# Patient Record
Sex: MALE | Race: WHITE | HISPANIC OR LATINO | Employment: FULL TIME | URBAN - METROPOLITAN AREA
[De-identification: names, ages, dates, MRNs, and addresses within clinical notes are randomized per-mention and may not be internally consistent; named-entity substitution may affect disease eponyms.]

---

## 2022-10-31 ENCOUNTER — OFFICE VISIT (OUTPATIENT)
Dept: URGENT CARE | Facility: PHYSICIAN GROUP | Age: 22
End: 2022-10-31
Payer: COMMERCIAL

## 2022-10-31 VITALS
WEIGHT: 131 LBS | DIASTOLIC BLOOD PRESSURE: 68 MMHG | BODY MASS INDEX: 19.4 KG/M2 | RESPIRATION RATE: 12 BRPM | SYSTOLIC BLOOD PRESSURE: 110 MMHG | OXYGEN SATURATION: 96 % | HEIGHT: 69 IN | TEMPERATURE: 97.3 F | HEART RATE: 66 BPM

## 2022-10-31 DIAGNOSIS — K11.20 SUBMANDIBULAR GLAND INFLAMMATION: ICD-10-CM

## 2022-10-31 PROCEDURE — 99203 OFFICE O/P NEW LOW 30 MIN: CPT | Performed by: PHYSICIAN ASSISTANT

## 2022-10-31 RX ORDER — LIDOCAINE HYDROCHLORIDE 20 MG/ML
SOLUTION OROPHARYNGEAL
Qty: 120 ML | Refills: 0 | Status: SHIPPED | OUTPATIENT
Start: 2022-10-31

## 2022-10-31 RX ORDER — AMOXICILLIN AND CLAVULANATE POTASSIUM 875; 125 MG/1; MG/1
1 TABLET, FILM COATED ORAL 2 TIMES DAILY
Qty: 14 TABLET | Refills: 0 | Status: SHIPPED | OUTPATIENT
Start: 2022-10-31 | End: 2022-11-07

## 2022-10-31 NOTE — PROGRESS NOTES
"Subjective:   Julio C Jaramillo is a 22 y.o. male who presents for Swollen Glands (X 4 days underneath tongue )      HPI  The patient presents to the Urgent Care with complaints of swollen and tender salivary glands underneath his tongue.  Onset 3 days ago.  Denies any purulent drainage or discharge from the area.  No injury or trauma.  Redness and swelling to the ducts underneath his tongue.  Denies any trouble swallowing.  He denies any sore throat. Denies any fever, chills, sore throat, cough        Medications:    This patient does not have an active medication from one of the medication groupers.    Allergies: Patient has no allergy information on record.    Problem List: Julio C Jaramillo does not have a problem list on file.    Surgical History:  No past surgical history on file.    Past Social Hx: Julio C Jaramillo       Past Family Hx:  Julio C Jaramillo family history is not on file.     Problem list, medications, and allergies reviewed by myself today in Epic.     Objective:     /68 (BP Location: Left arm, Patient Position: Sitting, BP Cuff Size: Adult)   Pulse 66   Temp 36.3 °C (97.3 °F) (Temporal)   Resp 12   Ht 1.753 m (5' 9\")   Wt 59.4 kg (131 lb)   SpO2 96%   BMI 19.35 kg/m²     Physical Exam  Vitals reviewed.   Constitutional:       General: He is not in acute distress.     Appearance: Normal appearance. He is not ill-appearing or toxic-appearing.   HENT:      Mouth/Throat:      Pharynx: Oropharynx is clear. Uvula midline. No pharyngeal swelling, oropharyngeal exudate or posterior oropharyngeal erythema.      Tonsils: No tonsillar exudate or tonsillar abscesses.      Comments: Mild erythema and edema to ji's duct. No obvious visualized or palpable stone. No purulent drainage. Mildly tender. No mass.   Eyes:      Conjunctiva/sclera: Conjunctivae normal.      Pupils: Pupils are equal, round, and reactive to light.   Neck:      Comments: Mildly tender submandibular glands. " No erythema, edema, mass, or fluctuance.   Cardiovascular:      Rate and Rhythm: Normal rate.   Pulmonary:      Effort: Pulmonary effort is normal.   Musculoskeletal:      Cervical back: Neck supple. No rigidity.   Lymphadenopathy:      Cervical: No cervical adenopathy.      Right cervical: No superficial cervical adenopathy.     Left cervical: No superficial cervical adenopathy.   Skin:     General: Skin is warm and dry.   Neurological:      General: No focal deficit present.      Mental Status: He is alert and oriented to person, place, and time.   Psychiatric:         Mood and Affect: Mood normal.         Behavior: Behavior normal.       Diagnosis and associated orders:     1. Submandibular gland inflammation  - amoxicillin-clavulanate (AUGMENTIN) 875-125 MG Tab; Take 1 Tablet by mouth 2 times a day for 7 days.  Dispense: 14 Tablet; Refill: 0  - lidocaine (XYLOCAINE) 2 % Solution; Take 5 mL by mouth every 4-6 hours as needed for throat/mouth pain (rinse, gargle, and spit)  Dispense: 120 mL; Refill: 0     Comments/MDM:     Inflammation of [place]'s ducts.  Unclear if obstructed.  Will cover for possible developing infection with Augmentin.  Lidocaine solution for discomfort.  Ibuprofen for inflammation and pain.  Lemon drops.  Dental and oral hygiene.       I personally reviewed prior external notes and test results pertinent to today's visit. Pathogenesis of diagnosis discussed including typical length and natural progression. Supportive care, natural history, differential diagnoses, and indications for immediate follow-up discussed. Patient expresses understanding and agrees to plan. Patient denies any other questions or concerns.     Follow-up with the primary care physician for recheck, reevaluation, and consideration of further management.    Please note that this dictation was created using voice recognition software. I have made a reasonable attempt to correct obvious errors, but I expect that there are  errors of grammar and possibly content that I did not discover before finalizing the note.    This note was electronically signed by Gordy Milner PA-C

## 2022-12-21 ENCOUNTER — HOSPITAL ENCOUNTER (OUTPATIENT)
Dept: RADIOLOGY | Facility: MEDICAL CENTER | Age: 22
End: 2022-12-21
Attending: PHYSICIAN ASSISTANT
Payer: COMMERCIAL

## 2022-12-21 ENCOUNTER — OFFICE VISIT (OUTPATIENT)
Dept: URGENT CARE | Facility: CLINIC | Age: 22
End: 2022-12-21
Payer: COMMERCIAL

## 2022-12-21 ENCOUNTER — HOSPITAL ENCOUNTER (OUTPATIENT)
Facility: MEDICAL CENTER | Age: 22
End: 2022-12-21
Attending: PHYSICIAN ASSISTANT
Payer: COMMERCIAL

## 2022-12-21 VITALS
WEIGHT: 137 LBS | HEART RATE: 81 BPM | BODY MASS INDEX: 20.29 KG/M2 | OXYGEN SATURATION: 97 % | RESPIRATION RATE: 20 BRPM | HEIGHT: 69 IN | SYSTOLIC BLOOD PRESSURE: 120 MMHG | DIASTOLIC BLOOD PRESSURE: 78 MMHG | TEMPERATURE: 97.9 F

## 2022-12-21 DIAGNOSIS — N50.89 TESTICLE SWELLING: ICD-10-CM

## 2022-12-21 LAB
APPEARANCE UR: CLEAR
BILIRUB UR STRIP-MCNC: NEGATIVE MG/DL
COLOR UR AUTO: YELLOW
GLUCOSE UR STRIP.AUTO-MCNC: NEGATIVE MG/DL
KETONES UR STRIP.AUTO-MCNC: NEGATIVE MG/DL
LEUKOCYTE ESTERASE UR QL STRIP.AUTO: NEGATIVE
NITRITE UR QL STRIP.AUTO: POSITIVE
PH UR STRIP.AUTO: 7 [PH] (ref 5–8)
PROT UR QL STRIP: NEGATIVE MG/DL
RBC UR QL AUTO: NEGATIVE
SP GR UR STRIP.AUTO: 1.01
UROBILINOGEN UR STRIP-MCNC: 0.2 MG/DL

## 2022-12-21 PROCEDURE — 99214 OFFICE O/P EST MOD 30 MIN: CPT | Performed by: PHYSICIAN ASSISTANT

## 2022-12-21 PROCEDURE — 81002 URINALYSIS NONAUTO W/O SCOPE: CPT | Performed by: PHYSICIAN ASSISTANT

## 2022-12-21 PROCEDURE — 87491 CHLMYD TRACH DNA AMP PROBE: CPT

## 2022-12-21 PROCEDURE — 76870 US EXAM SCROTUM: CPT

## 2022-12-21 PROCEDURE — 87591 N.GONORRHOEAE DNA AMP PROB: CPT

## 2022-12-21 PROCEDURE — 87086 URINE CULTURE/COLONY COUNT: CPT

## 2022-12-21 ASSESSMENT — ENCOUNTER SYMPTOMS
FEVER: 0
AFFECTED TESTICLE: 1
CHILLS: 0
VOMITING: 0
FLANK PAIN: 0
ABDOMINAL PAIN: 1
NAUSEA: 0

## 2022-12-21 NOTE — PROGRESS NOTES
"Subjective:   Julio C Jaramillo  is a 22 y.o. male who presents for Testicle Pain (Swelling R testicle post sexual activity)      Testicle Pain  This is a new problem. The current episode started in the past 7 days. Associated symptoms include abdominal pain (Mild right low suprapubic). Pertinent negatives include no chills, fever, nausea, rash or vomiting.     Patient resents urgent care noting right testicle swelling that is mild and achy discomfort associated.  Patient states a similar process occurred last [place]  At that time ultrasound showed epididymitis.  He states he was initially treated with antibiotics and told to stop based on negative results of testing for gonorrhea chlamydia and other STIs.  Patient is in a long-term homosexual relationship.  His partner did recently visit him and they did have intercourse.  He denies any anal intercourse.  He noted increase in the above symptoms thereafter.  Patient states it may have been associated with mild trauma from the sexual encounter.  He describes achy swelling discomfort right testicle that worsens with time on his feet at work.  As he sits down he gets some relief.  Denies dysuria frequency urgency or hematuria.  Denies penile discharge.  Denies any genital rash.  He describes mild aching discomfort from right suprapubic area that radiates past testicle.    Review of Systems   Constitutional:  Negative for chills and fever.   Gastrointestinal:  Positive for abdominal pain (Mild right low suprapubic). Negative for nausea and vomiting.   Genitourinary:  Negative for dysuria, flank pain, frequency, hematuria and urgency.        Right testicle mild swelling and achy discomfort   Skin:  Negative for rash.     No Known Allergies     Objective:   /78   Pulse 81   Temp 36.6 °C (97.9 °F) (Temporal)   Resp 20   Ht 1.753 m (5' 9\")   Wt 62.1 kg (137 lb)   SpO2 97%   BMI 20.23 kg/m²     Physical Exam  Vitals and " nursing note reviewed.   Constitutional:       General: He is not in acute distress.     Appearance: Normal appearance. He is well-developed. He is not diaphoretic.   HENT:      Head: Normocephalic and atraumatic.      Right Ear: External ear normal.      Left Ear: External ear normal.      Nose: Nose normal.   Eyes:      General: No scleral icterus.        Right eye: No discharge.         Left eye: No discharge.      Conjunctiva/sclera: Conjunctivae normal.   Pulmonary:      Effort: Pulmonary effort is normal. No respiratory distress.   Abdominal:      Palpations: Abdomen is soft.      Tenderness: There is abdominal tenderness in the suprapubic area. There is no guarding or rebound. Negative signs include McBurney's sign.       Genitourinary:     Testes:         Right: Tenderness and swelling (possible trace, minimally clinically appreciative) present. Mass not present. Right testis is descended.      Epididymis:      Right: Tenderness present.   Musculoskeletal:         General: Normal range of motion.      Cervical back: Neck supple.   Skin:     General: Skin is warm and dry.      Coloration: Skin is not pale.   Neurological:      Mental Status: He is alert and oriented to person, place, and time.      Coordination: Coordination normal.     POCT UA -   Results for orders placed or performed in visit on 12/21/22   POCT Urinalysis   Result Value Ref Range    POC Color Yellow Negative    POC Appearance Clear Negative    POC Leukocyte Esterase Negative Negative    POC Nitrites Positive Negative    POC Urobiligen 0.2 Negative (0.2) mg/dL    POC Protein Negative Negative mg/dL    POC Urine PH 7.0 5.0 - 8.0    POC Blood Negative Negative    POC Specific Gravity 1.015 <1.005 - >1.030    POC Ketones Negative Negative mg/dL    POC Bilirubin Negative Negative mg/dL    POC Glucose Negative Negative mg/dL       GC pending  Urine cx pending    US SCROTAL CONTENTS -   IMPRESSION:     1.  Normal scrotum ultrasound.            Exam Ended: 12/21/22  4:56 PM Last Resulted: 12/21/22  5:04 PM           Assessment/Plan:   1. Testicle swelling  - POCT Urinalysis  - Chlamydia/GC, PCR (Urine); Future  - URINE CULTURE(NEW); Future  - [id number]; Future    Normal scrotal ultrasound  Nitrates seen on urinalysis but patient denies any urinary symptoms.  Will await urine culture and GC testing for further treatment    We will update patient with normal results via [facility] and call with need for additional treatment.  Return to clinic with lack of resolution or progression of symptoms.      I have worn an N95 mask, gloves and eye protection for the entire encounter with this patient.     Differential diagnosis, natural history, supportive care, and indications for immediate follow-up discussed.    My total time spent caring for the patient on the day of the encounter was 35 minutes.   This does not include time spent on separately billable procedures/tests.

## 2022-12-22 DIAGNOSIS — N50.89 TESTICLE SWELLING: ICD-10-CM

## 2022-12-22 LAB
C TRACH DNA SPEC QL NAA+PROBE: NEGATIVE
N GONORRHOEA DNA SPEC QL NAA+PROBE: NEGATIVE
SPECIMEN SOURCE: NORMAL

## 2022-12-24 LAB
BACTERIA UR CULT: NORMAL
SIGNIFICANT IND 70042: NORMAL
SITE SITE: NORMAL
SOURCE SOURCE: NORMAL

## 2023-04-05 ENCOUNTER — OFFICE VISIT (OUTPATIENT)
Dept: URGENT CARE | Facility: PHYSICIAN GROUP | Age: 23
End: 2023-04-05
Payer: COMMERCIAL

## 2023-04-05 VITALS
DIASTOLIC BLOOD PRESSURE: 70 MMHG | WEIGHT: 134 LBS | OXYGEN SATURATION: 97 % | TEMPERATURE: 97.8 F | RESPIRATION RATE: 14 BRPM | HEIGHT: 69 IN | HEART RATE: 64 BPM | BODY MASS INDEX: 19.85 KG/M2 | SYSTOLIC BLOOD PRESSURE: 106 MMHG

## 2023-04-05 DIAGNOSIS — U07.1 COVID-19: ICD-10-CM

## 2023-04-05 LAB
FLUAV RNA SPEC QL NAA+PROBE: NEGATIVE
FLUBV RNA SPEC QL NAA+PROBE: NEGATIVE
RSV RNA SPEC QL NAA+PROBE: NEGATIVE
SARS-COV-2 RNA RESP QL NAA+PROBE: POSITIVE

## 2023-04-05 PROCEDURE — 99213 OFFICE O/P EST LOW 20 MIN: CPT | Mod: CS | Performed by: NURSE PRACTITIONER

## 2023-04-05 PROCEDURE — 0241U POCT CEPHEID COV-2, FLU A/B, RSV - PCR: CPT | Performed by: NURSE PRACTITIONER

## 2023-04-05 ASSESSMENT — ENCOUNTER SYMPTOMS
EYES NEGATIVE: 1
HEADACHES: 1
RHINORRHEA: 1
COUGH: 1
MYALGIAS: 1
FEVER: 0
CARDIOVASCULAR NEGATIVE: 1
GASTROINTESTINAL NEGATIVE: 1
CHILLS: 0

## 2023-04-05 NOTE — PROGRESS NOTES
"Subjective:   Julio C Jaramillo is a 22 y.o. male who presents for Nasal Congestion (Some coughing, runny nose, congestion in head, body aches. X3 days. Wants COVID test. )      URI   This is a new problem. Episode onset: 3 days. The problem has been gradually worsening. There has been no fever. Associated symptoms include congestion, coughing, headaches and rhinorrhea. He has tried acetaminophen and NSAIDs for the symptoms. The treatment provided mild relief.     Review of Systems   Constitutional:  Positive for malaise/fatigue. Negative for chills and fever.   HENT:  Positive for congestion and rhinorrhea.    Eyes: Negative.    Respiratory:  Positive for cough.    Cardiovascular: Negative.    Gastrointestinal: Negative.    Musculoskeletal:  Positive for myalgias.   Skin: Negative.    Neurological:  Positive for headaches.     Medications, Allergies, and current problem list reviewed today in Epic.     Objective:     /70   Pulse 64   Temp 36.6 °C (97.8 °F)   Resp 14   Ht 1.753 m (5' 9\")   Wt 60.8 kg (134 lb)   SpO2 97%     Physical Exam  Vitals reviewed.   Constitutional:       General: He is not in acute distress.     Appearance: Normal appearance. He is normal weight.   HENT:      Head: Normocephalic and atraumatic.      Right Ear: Tympanic membrane, ear canal and external ear normal.      Left Ear: Tympanic membrane, ear canal and external ear normal.      Nose: Congestion and rhinorrhea present.      Mouth/Throat:      Mouth: Mucous membranes are moist.      Pharynx: Oropharynx is clear.   Eyes:      Extraocular Movements: Extraocular movements intact.      Conjunctiva/sclera: Conjunctivae normal.      Pupils: Pupils are equal, round, and reactive to light.   Cardiovascular:      Rate and Rhythm: Normal rate and regular rhythm.      Pulses: Normal pulses.      Heart sounds: Normal heart sounds.   Pulmonary:      Effort: Pulmonary effort is normal.      Breath sounds: Normal breath sounds. "   Abdominal:      General: Abdomen is flat. Bowel sounds are normal.      Palpations: Abdomen is soft.   Musculoskeletal:         General: Normal range of motion.      Cervical back: Normal range of motion and neck supple.   Skin:     General: Skin is warm and dry.      Capillary Refill: Capillary refill takes less than 2 seconds.   Neurological:      General: No focal deficit present.      Mental Status: He is alert and oriented to person, place, and time.   Psychiatric:         Mood and Affect: Mood normal.         Behavior: Behavior normal.       Assessment/Plan:     Diagnosis and associated orders:     1. COVID-19  POCT CoV-2, Flu A/B, RSV by PCR         Comments/MDM:     Patient found to be positive today in clinic with the rapid antigen test  Discussed with patient red flag signs, encouraged use of a pulse oximeter, especially if unvaccinated and consider ER presentation if pulse oxygenation dips below 90%  At this point this patient is hemodynamically stable and eligible for outpatient therapy  Discussed supportive care measures, contagiousness, CDC guidelines  Patient demonstrated clear verbal understanding my instructions and understood the gravity of the situation as well as the need to monitor symptoms and treat this seriously  After the patient was found to be Covid positive on the rapid antigen test I donned my appropriate PPE and had a prolonged conversation with the patient of the above situation.            Differential diagnosis, natural history, supportive care, and indications for immediate follow-up discussed.    Advised the patient to follow-up with the primary care physician for recheck, reevaluation, and consideration of further management.    Please note that this dictation was created using voice recognition software. I have made a reasonable attempt to correct obvious errors, but I expect that there are errors of grammar and possibly content that I did not discover before finalizing the  note.    This note was electronically signed by SHABANA Ho

## 2023-04-05 NOTE — LETTER
April 5, 2023       Patient: Julio C Jaramillo   YOB: 2000   Date of Visit: 4/5/2023         To Whom It May Concern:    In my medical opinion, I recommend that Julio C Jaramillo be excused from work from 4/4/4023 through 4/7/2023 due to illness.     If you have any questions or concerns, please don't hesitate to call [phone]          Sincerely,          [email or web address].  Electronically Signed